# Patient Record
Sex: FEMALE | Race: WHITE
[De-identification: names, ages, dates, MRNs, and addresses within clinical notes are randomized per-mention and may not be internally consistent; named-entity substitution may affect disease eponyms.]

---

## 2019-08-19 ENCOUNTER — HOSPITAL ENCOUNTER (EMERGENCY)
Dept: HOSPITAL 46 - ED | Age: 79
Discharge: HOME | End: 2019-08-19
Payer: MEDICARE

## 2019-08-19 VITALS — BODY MASS INDEX: 34.07 KG/M2 | WEIGHT: 230.01 LBS | HEIGHT: 69 IN

## 2019-08-19 DIAGNOSIS — Z88.2: ICD-10-CM

## 2019-08-19 DIAGNOSIS — Z79.899: ICD-10-CM

## 2019-08-19 DIAGNOSIS — Z87.891: ICD-10-CM

## 2019-08-19 DIAGNOSIS — R07.89: Primary | ICD-10-CM

## 2019-08-19 DIAGNOSIS — Z88.0: ICD-10-CM

## 2019-08-19 DIAGNOSIS — Z79.82: ICD-10-CM

## 2019-08-19 DIAGNOSIS — I10: ICD-10-CM

## 2019-08-19 NOTE — EKG
[facility]
                                    [address]
                                  [place]  [address]
_________________________________________________________________________________________
                                                                 Signed   
 
 
Normal sinus rhythm
Nonspecific ST abnormality
Abnormal ECG
No previous ECGs available
Confirmed by RAKAN CHAIDEZ MD ([id number]) on 8/19/2019 12:43:18 PM
 
 
 
 
 
 
 
 
 
 
 
 
 
 
 
 
 
 
 
 
 
 
 
 
 
 
 
 
 
 
 
 
 
 
 
 
 
 
 
 
    Electronically Signed By: RAKAN CHAIDEZ MD  08/19/19 1243
_________________________________________________________________________________________
PATIENT NAME:     LAURA STEWARD                  
MEDICAL RECORD #: [id number]                     Electrocardiogram             
          ACCT #: [id number]  
DATE OF BIRTH:   [birth year]                                       
PHYSICIAN:   RAKAN CHAIDEZ MD           REPORT #: [id number]
REPORT IS CONFIDENTIAL AND NOT TO BE RELEASED WITHOUT AUTHORIZATION

## 2020-07-28 ENCOUNTER — HOSPITAL ENCOUNTER (EMERGENCY)
Dept: HOSPITAL 46 - ED | Age: 80
LOS: 1 days | Discharge: HOME | End: 2020-07-29
Payer: MEDICARE

## 2020-07-28 VITALS — HEIGHT: 69 IN | WEIGHT: 230 LBS | BODY MASS INDEX: 34.07 KG/M2

## 2020-07-28 DIAGNOSIS — Z88.2: ICD-10-CM

## 2020-07-28 DIAGNOSIS — Z79.82: ICD-10-CM

## 2020-07-28 DIAGNOSIS — Z79.899: ICD-10-CM

## 2020-07-28 DIAGNOSIS — Z88.0: ICD-10-CM

## 2020-07-28 DIAGNOSIS — I10: ICD-10-CM

## 2020-07-28 DIAGNOSIS — I16.0: Primary | ICD-10-CM

## 2020-07-28 DIAGNOSIS — Z87.891: ICD-10-CM

## 2020-07-30 ENCOUNTER — HOSPITAL ENCOUNTER (EMERGENCY)
Dept: HOSPITAL 46 - ED | Age: 80
Discharge: HOME | End: 2020-07-30
Payer: MEDICARE

## 2020-07-30 VITALS — BODY MASS INDEX: 34.07 KG/M2 | WEIGHT: 230.01 LBS | HEIGHT: 69 IN

## 2020-07-30 DIAGNOSIS — Z79.899: ICD-10-CM

## 2020-07-30 DIAGNOSIS — Z88.2: ICD-10-CM

## 2020-07-30 DIAGNOSIS — E78.5: ICD-10-CM

## 2020-07-30 DIAGNOSIS — Z87.891: ICD-10-CM

## 2020-07-30 DIAGNOSIS — I10: Primary | ICD-10-CM

## 2020-07-30 DIAGNOSIS — Z88.0: ICD-10-CM

## 2020-07-30 DIAGNOSIS — K21.9: ICD-10-CM

## 2020-07-30 NOTE — XMS
PreManage Notification: LAURA STEWARD MRN:[id number]
 
Security Information
 
Security Events
No recent Security Events currently on file
 
 
 
CRITERIA MET
------------
- [facility] - 2 Visits in 30 Days
 
 
CARE PROVIDERS
There are no care providers on record at this time.
 
[facility] has no Care Guidelines for this patient.
 
[facility] VISIT COUNT (12 MO.)
-------------------------------------------------------------------------------------
[facility]
-------------------------------------------------------------------------------------
TOTAL 3
-------------------------------------------------------------------------------------
NOTE: Visits indicate total known visits.
 
ED/[facility] VISIT TRACKING (12 MO.)
-------------------------------------------------------------------------------------
[facility]2020 15:48
[facility]
 
[facility]
 
COMPLAINT:
- HIGH BLOOD PRESSURE
-------------------------------------------------------------------------------------
07/28/2020 22:25
[facility]
 
TYPE: Emergency
 
COMPLAINT:
- POSSIBLE HIGH BP
-------------------------------------------------------------------------------------
08/19/2019 05:01
[facility]
 
TYPE: Emergency
 
COMPLAINT:
- CHEST PAIN
 
DIAGNOSES:
- Allergy status to penicillin
- Long term (current) use of aspirin
- Other chest pain
- Allergy status to sulfonamides status
- Personal history of nicotine dependence
 
- Essential (primary) hypertension
- Other long term (current) drug therapy
-------------------------------------------------------------------------------------
 
 
INPATIENT VISIT TRACKING (12 MO.)
No inpatient visits to display in this time frame
 
[email or web address]

## 2020-07-30 NOTE — EKG
[facility]
                                    [address]
                                  [place], [place]  [address]
_________________________________________________________________________________________
                                                                 Signed   
 
 
Normal sinus rhythm
Normal ECG
When compared with ECG of 19-AUG-2019 05:07,
No significant change was found
Confirmed by DEANNE PENA DO ([id number]) on 7/30/2020 9:14:46 PM
 
 
 
 
 
 
 
 
 
 
 
 
 
 
 
 
 
 
 
 
 
 
 
 
 
 
 
 
 
 
 
 
 
 
 
 
 
 
 
 
    Electronically Signed By: DEANNE PENA DO  07/30/20 2115
_________________________________________________________________________________________
PATIENT NAME:     LAURA STEWARD RICHARDRON                  
MEDICAL RECORD #: [id number]                     Electrocardiogram             
          ACCT #: [id number]  
DATE OF BIRTH:   [birth year]                                       
PHYSICIAN:   DEANNE PENA DO                     REPORT #: [id number]
REPORT IS CONFIDENTIAL AND NOT TO BE RELEASED WITHOUT AUTHORIZATION

## 2022-07-18 ENCOUNTER — HOSPITAL ENCOUNTER (EMERGENCY)
Dept: HOSPITAL 46 - ED | Age: 82
Discharge: HOME | End: 2022-07-18
Payer: MEDICARE

## 2022-07-18 VITALS — HEIGHT: 69 IN | BODY MASS INDEX: 34.07 KG/M2 | WEIGHT: 230.01 LBS

## 2022-07-18 DIAGNOSIS — I10: ICD-10-CM

## 2022-07-18 DIAGNOSIS — Z79.899: ICD-10-CM

## 2022-07-18 DIAGNOSIS — E78.5: ICD-10-CM

## 2022-07-18 DIAGNOSIS — Z20.822: ICD-10-CM

## 2022-07-18 DIAGNOSIS — Z87.891: ICD-10-CM

## 2022-07-18 DIAGNOSIS — K57.32: Primary | ICD-10-CM

## 2022-07-18 DIAGNOSIS — Z88.2: ICD-10-CM

## 2022-07-18 DIAGNOSIS — Z79.82: ICD-10-CM

## 2022-07-18 DIAGNOSIS — Z88.0: ICD-10-CM

## 2022-07-18 DIAGNOSIS — K21.9: ICD-10-CM

## 2022-07-18 PROCEDURE — U0003 INFECTIOUS AGENT DETECTION BY NUCLEIC ACID (DNA OR RNA); SEVERE ACUTE RESPIRATORY SYNDROME CORONAVIRUS 2 (SARS-COV-2) (CORONAVIRUS DISEASE [COVID-19]), AMPLIFIED PROBE TECHNIQUE, MAKING USE OF HIGH THROUGHPUT TECHNOLOGIES AS DESCRIBED BY CMS-2020-01-R: HCPCS

## 2022-07-18 PROCEDURE — 0T9B70Z DRAINAGE OF BLADDER WITH DRAINAGE DEVICE, VIA NATURAL OR ARTIFICIAL OPENING: ICD-10-PCS | Performed by: EMERGENCY MEDICINE

## 2022-07-18 PROCEDURE — C9803 HOPD COVID-19 SPEC COLLECT: HCPCS
